# Patient Record
(demographics unavailable — no encounter records)

---
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
## 2024-12-11 NOTE — PLAN
[FreeTextEntry3] : 1. Follow up is offered with Dr. Huggins every ~6-8 wks  Requested shortly before next visit: -IEP -Teacher intake form -Teacher PATRICE form  2. Medication: __12/2024: To trial off all medication for next few wks and then gather school info - forms to  need/targets for medication. If needed consider restarting TERRENCE 10mg and/or INtuniv up to prior 3mg dose. Medications were in the past  thought to be beneficial and were tolerated well and only stopped due to insurance issues.  Noted with fmhx of psychiatric illness SE profile of stimulants may be less predictable.  3. Heart rate: -one instance complained heart beating strongly - monitor - if occurs again recommend evaluation by PMD (this was prior to TERRENCE trial but while was on Intuniv)  4. School: -11/2023: discussed pros/cons of remaining in [facility] vs. local school district SPED classroom -1/2024: consider fading 1:1 if has one, appears might move to [facility] 12:1 - fair option, during IEP recc discussing supports available if things dong go well  5. Discussed: __12/2024: - inform school counselor about passing of GMs and mother to see if any counseling available -resource regarding death/bereavement sent -SW tasked to assist perhaps connecting to therapist to address  Previous Recommendations:  A.  *. Therapy: -recommend that Rock and his father work with a therapist/Child psychologist to address his behavioral challenges and self-regulation. Recommend that there be a component on parent training to the therapy. ([employer], [employer])  *. School services: -Rock continues requiring intensive school services to address his challenging behavior and academic weaknesses -if despite improvement in behavior/attention overtime academic weaknesses persist Rock should be evaluated for a learning disability  B. Oppositional Defiant Disorder:  Information regarding ODD can be found at the following link: https://www.aacap.org/AACAP/Families_and_Youth/Resource_Centers/Oppositional_Defiant_Disorder_Resource_Center/Home.aspx A Parent guide can be found at the following link: https://www.aacap.org/App_Themes/AACAP/docs/resource_centers/odd/odd_resource_center_odd_guide.pdf  C. ADHD:  Attention-deficit/hyperactivity disorder (ADHD) is a biological and behavioral syndrome characterized by difficulties with sustained attention, often resulting in careless mistakes on schoolwork or other activities; difficulty with planning or organization; distractibility, apparent forgetfulness, and/or trouble listening; and trouble with behavioral inhibition, including fidgeting, impulsive responding, and hyperactivity. The most effective treatment for ADHD is a combination of behavioral and pharmacological strategies to address symptoms in the various settings in which they occur. Yet, pharmacological interventions alone will not address ADHD symptoms, so is important for behavioral approaches to be implemented to enhance attentional control in the classroom and home environments.  Additional information regarding ADHD medications is available at www.aacap.org/App_Themes/AACAP/docs/resource_centers/resources/med_guides/adhd_parents_medication_guide_english.pdf (or search online for [employer])  Students whose school progress is significantly impaired by their ADHD are entitled to special education help. Some students with ADHD are eligible, under the Individuals with Disabilities Education Act (IDEA), for an individual education plan (IEP) for special education services, while others are entitled to classroom accommodations under Section 504 of the Americans with Disabilities Act.   1. Classroom Recommendations:   Your child should have a structured, educational plan that articulates specific educational and behavioral strategies for ADHD. It is important that teachers and parents are involved in implementing the educational plan, and the plan be implemented with the assistance of a coordinator at your child's school, with consistent follow-up among team members. Specific, individualized accommodations are to be determined by educators. Suggested accommodations include but are not limited to the following: -preferential seating in class away from distractions -extended time for all test taking -test taking in a distraction - free environment -testing modifications or tests read -modified homework as appropriate -refocusing during class work to ensure is on task -individualized behavioral modification / intervention -establish a non-verbal cue between teacher and student for behavior monitoring -directions repeated/explained -ongoing frequent parent communication -extra set of textbooks to keep at home -organizational assists and training -provide for socialization opportunities  2. SOCIAL:  Children with ADHD often have difficulty attending to salient information in social relationships as well as in being aware of how their impulsive behavior may affect their social relationships. As such, children with ADHD often require direct intervention to improve social functioning, such as a social skills group or "lunch bunch."  RESOURCES: 3. There are many helpful resources about ADHD, including:  (a) "Taking Charge of ADHD, Revised Edition: The Complete, Authoritative Guide for Parents" by Christopher Sharpe  (b) Information on ADHD from the advocacy group Children and Adolescents with Attention Deficit Disorder is at www.humaira.org and from the National [place] of Health www.nimh.nih.gov/healthinformation/adhdmenu.cfm. Additionally, [employer] has published a second edition of the Educator's Manual on ADHD with comprehensive, up-to-date, science-based approaches to understanding the condition and approaches for coping with it. Topics include effective teaching strategies and accommodations, techniques for managing behavioral and social challenges, and laws pertaining to ADHD. See www.humaira.org/sources/Orders.  (c) "Putting on the Breaks" by Clarence & Henri and "Jumpin' Manpreet Settles Down: A Workbook to Help Impulsive Children Learn to Think Before They Act (Ages 5 to 10) by Maximo Del Cid.  (d) "Driven to Distraction: Recognizing and Coping with ADD from Childhood to Adulthood" by Hans & Stanley.  (e) Resources on ADHD from Tyree Lawler, PhD, at [facility] at [facility] at www.ccf.[place].[facility]/default.php or www.casgroups.[facility]/CCF.  (f) "Executive Skills in Children and Adolescents: A Practical Guide to Assessment and Intervention, Second Edition," by Serena Rojo and Per Salcido provides a comprehensive overview of executive functioning in children as well as how to best support a child with poor executive functioning in the classroom.   It was a pleasure to work with Rock and his family today. Please do not hesitate to contact Dr. Huggins at [phone] with any other further questions or concerns.

## 2025-01-22 NOTE — REASON FOR VISIT
[FreeTextEntry2] : Follow up for ADHD and ODD monitoring and medication management. [FreeTextEntry4] : Metadate CD 10mg on school days (Stopped about a month ago) Guanfacine ER 2mg daily  [FreeTextEntry1] : Rock and Father [FreeTextEntry3] : December 2024

## 2025-01-22 NOTE — HISTORY OF PRESENT ILLNESS
[FreeTextEntry5] : Grade/School: 3rd Grade Classroom Setting: Self-Contained 12:1:1? IEP:  Services: Counseling (group/ind), 1:1 aide, BIP, ESY Program Private tutoring/therapy: None  [FreeTextEntry1] : School/Academics: -Dad thinks the new school put him in an inappropriate setting  -Met with school psych yesterday and trying to set up private therapy -No major concerns from the teacers but still getting to know him -One incident where a kid in gym was bothering him so he kicked him  -Doing great academically   -Switched school districts and now in [place] -Dad reports little communication/feedback from new school -Seems to get his work done -Dad reports some issues with attention/focus -Last year was doing well academically/behaviorally in [employer]   Home:  -Dad sees that he is becoming much more frustrated and having more emotional regulation - has started to become aggressive    -Mother passed away of an overdose in fall 2024 - [place] may ask when he'll see her again sometimes -Behavior has been better than in the past - more manageable now -More mature and relaxed now -He might still become frustrated with video games like [employer]  Social: Overall, does well and gets along with his peers.  Activities: None, Currently  Medication/Side Effects:  -Stopped TERRENCE because they ran out and then had insurance issues -Was at guanfacine 3mg but stopped due to insurance problem Duration: Takes it at 8am and wears off about 3:45pm Appetite/Diet: Some decrease midday but appetite is variable in general. Seems to make up the difference when the medicine wears off. Sleep: Overall sleeps well - some difficulty settling down some nights. HA: None SA: None Tics: None [FreeTextEntry6] : Medication Hx: *Guanfacine (2/2022-4/2022): -short acting - built up to TDD 1.5mg - noticeable benefit but waning -4/2022: to trial ER building to 2mg  *Guanfacine ER(~4/2022 - ): -1/2023: taking 2mg - some benefit -12/2024: currently not taking due to insurance issues at some point at max took 3mg  *TERRENCE 10mg (1/2023-): -3/2023: taking 10mg -6/2023: tried a few days w/o med and teachers reported w/o med more issues - restarted w/ noted benefit. -12/2024: currently not taking due to insurance issues at some point

## 2025-01-22 NOTE — SOCIAL HISTORY
[FreeTextEntry6] : Household: Father and Rock  -12/2024 - lost both GM over the course of the year -Mother passed away of an overdose in 9/2024 - Rock may still ask when he is going to see her again

## 2025-01-22 NOTE — PHYSICAL EXAM
[Normal] : awake and interactive [Easily Distracted] : easily distracted [Needs frequent redirecting] : needs frequent redirecting [Able to redirect] : able to redirect [Fidgets] : fidgets [Well-behaved during visit] : well-behaved during visit [Appropriate eye contact] : appropriate eye contact [Smiles responsively] : smiles responsively [Positive mood] : positive mood [Answered questions appropriately] : answered questions appropriately [Attention Intact] : attention not intact [Oppositional] : not oppositional

## 2025-01-22 NOTE — HISTORY OF PRESENT ILLNESS
[FreeTextEntry5] : Grade/School: 3rd Grade Classroom Setting: Self-Contained 12:1:1? IEP:  Services: Counseling (group/ind), 1:1 aide, BIP, ESY Program Private tutoring/therapy: None  [FreeTextEntry1] : School/Academics: -Dad thinks the new school put him in an inappropriate setting  -Met with school psych yesterday and trying to set up private therapy -No major concerns from the teacers but still getting to know him -One incident where a kid in gym was bothering him so he kicked him  -Doing great academically   -Switched school districts and now in [place] -Dad reports little communication/feedback from new school -Seems to get his work done -Dad reports some issues with attention/focus -Last year was doing well academically/behaviorally in [employer]   Home:  -Dad sees that he is becoming much more frustrated and having more emotional regulation - has started to become aggressive    -Mother passed away of an overdose in fall 2024 - [place] may ask when he'll see her again sometimes -Behavior has been better than in the past - more manageable now -More mature and relaxed now -He might still become frustrated with video games like [employer]  Social: Overall, does well and gets along with his peers.  Activities: None, Currently  Medication/Side Effects:  -Stopped TERRENCE because they ran out and then had insurance issues -Was at guanfacine 3mg but stopped due to insurance problem Duration: Takes it at 8am and wears off about 3:45pm Appetite/Diet: Some decrease midday but appetite is variable in general. Seems to make up the difference when the medicine wears off. Sleep: Overall sleeps well - some difficulty settling down some nights. HA: None SA: None Tics: None [FreeTextEntry6] : Medication Hx: *Guanfacine (2/2022-4/2022): -short acting - built up to TDD 1.5mg - noticeable benefit but waning -4/2022: to trial ER building to 2mg  *Guanfacine ER(~4/2022 - ): -1/2023: taking 2mg - some benefit -12/2024: currently not taking due to insurance issues at some point at max took 3mg  *TERRENCE 10mg (1/2023-): -3/2023: taking 10mg -6/2023: tried a few days w/o med and teachers reported w/o med more issues - restarted w/ noted benefit. -12/2024: currently not taking due to insurance issues at some point

## 2025-01-22 NOTE — PLAN
[FreeTextEntry3] : Continue IEP/Current Services  Answered parent/patient questions. Follow-up 3-4 months for continued monitoring Follow-up via telephone as needed.